# Patient Record
Sex: MALE | Race: WHITE | NOT HISPANIC OR LATINO | Employment: UNEMPLOYED | URBAN - METROPOLITAN AREA
[De-identification: names, ages, dates, MRNs, and addresses within clinical notes are randomized per-mention and may not be internally consistent; named-entity substitution may affect disease eponyms.]

---

## 2018-02-03 ENCOUNTER — HOSPITAL ENCOUNTER (EMERGENCY)
Facility: HOSPITAL | Age: 28
Discharge: HOME | End: 2018-02-03
Attending: EMERGENCY MEDICINE | Admitting: EMERGENCY MEDICINE

## 2018-02-03 VITALS
SYSTOLIC BLOOD PRESSURE: 105 MMHG | WEIGHT: 170 LBS | OXYGEN SATURATION: 97 % | RESPIRATION RATE: 26 BRPM | HEIGHT: 70 IN | BODY MASS INDEX: 24.34 KG/M2 | HEART RATE: 100 BPM | DIASTOLIC BLOOD PRESSURE: 78 MMHG

## 2018-02-03 DIAGNOSIS — S05.01XA ABRASION OF RIGHT CORNEA, INITIAL ENCOUNTER: Primary | ICD-10-CM

## 2018-02-03 PROCEDURE — 99284 EMERGENCY DEPT VISIT MOD MDM: CPT

## 2018-02-03 RX ORDER — HYDROCODONE BITARTRATE AND ACETAMINOPHEN 5; 325 MG/1; MG/1
1 TABLET ORAL EVERY 6 HOURS PRN
Qty: 6 TABLET | Refills: 0 | Status: SHIPPED | OUTPATIENT
Start: 2018-02-03

## 2018-02-03 RX ORDER — ERYTHROMYCIN 5 MG/G
OINTMENT OPHTHALMIC EVERY 6 HOURS
Qty: 3.5 G | Refills: 0 | Status: SHIPPED | OUTPATIENT
Start: 2018-02-03 | End: 2018-02-06

## 2018-02-03 RX ORDER — TETRACAINE HYDROCHLORIDE 5 MG/ML
2 SOLUTION OPHTHALMIC ONCE
Status: COMPLETED | OUTPATIENT
Start: 2018-02-03 | End: 2018-02-03

## 2018-02-03 RX ORDER — HYDROCODONE BITARTRATE AND ACETAMINOPHEN 7.5; 325 MG/1; MG/1
1 TABLET ORAL ONCE
Status: COMPLETED | OUTPATIENT
Start: 2018-02-03 | End: 2018-02-03

## 2018-02-03 RX ORDER — CYCLOPENTOLATE HYDROCHLORIDE 10 MG/ML
2 SOLUTION/ DROPS OPHTHALMIC ONCE
Status: COMPLETED | OUTPATIENT
Start: 2018-02-03 | End: 2018-02-03

## 2018-02-03 RX ORDER — ERYTHROMYCIN 5 MG/G
1 OINTMENT OPHTHALMIC ONCE
Status: COMPLETED | OUTPATIENT
Start: 2018-02-03 | End: 2018-02-03

## 2018-02-03 RX ADMIN — CYCLOPENTOLATE HYDROCHLORIDE 2 DROP: 10 SOLUTION/ DROPS OPHTHALMIC at 06:31

## 2018-02-03 RX ADMIN — ERYTHROMYCIN 1 APPLICATION: 5 OINTMENT OPHTHALMIC at 06:30

## 2018-02-03 RX ADMIN — HYDROCODONE BITARTRATE AND ACETAMINOPHEN 1 TABLET: 7.5; 325 TABLET ORAL at 06:44

## 2018-02-03 RX ADMIN — TETRACAINE HYDROCHLORIDE 2 DROP: 5 SOLUTION OPHTHALMIC at 06:31

## 2018-02-03 NOTE — ED PROVIDER NOTES
"Subjective   History of Present Illness  This 27-year-old gentleman presents the emergency department with complaints of right eye pain.  The patient sustained an injury to the right eye when the tool he was using apparently broke or slipped striking him in the right side of the face.  The patient initially had some mild discomfort complaining of a foreign body sensation which gradually worsened as he rubbed the eye.  The patient's pain is progressed to the point that he presents the emergency department with a fair amount of discomfort.  The patient complains of light sensitivity.  There is, only as noted.  The patient was not using power tools at the time nor was there welding going on in the area.    Past medical history is benign for chronic medical issues    Current medications none    Social history he is a smoker he denies drug use he does use alcohol    Review of systems the patient denies other trauma or injury he felt well prior to this and had no medical complaints, review of systems is limited to his immediate trauma and ocular issues.  His only noted issue regarding his vision is that he has \"astigmatism\" and feels that he needs to see an eye doctor as he has some visual blurring on a daily basis    Physical evaluation he is awake and alert he appears quite uncomfortable he is afebrile his vital signs are reviewed and noted a minimal tachycardia he is noted to be normal  Head is normocephalic and atraumatic  There is no evidence of acute facial injury or deformity  There is no facial numbness  There is a fair degree of discomfort and he is unwilling to open his eye and gentle retraction of the lid notes some erythema of the sclera there is no P1 discharge the globe appears intact extraocular movements are intact the pupil is round it is reactive to light directly and consensually  Tetracaine is instilled in the eye by myself following this the patient had almost complete resolution of his discomfort he " is able to open his eyes he is noted to have pupils are equal round reactive to light directly and consensually and extraocular movements are intact.  There is no double vision field of vision is normal to direct confrontation.  Visual acuities were Frieda obtained he is noted to be 20/50 in the affected eye and 20/20 in the unaffected eye he tells me that his vision seems to be near normal for him however.  Seen examination notes a moderate sized corneal abrasion situated outside of the visual axis and medial to the visual there is no streaming of tetracaine  Slit lamp examination reveals the after mentioned corneal abrasion no other acute injuries are identified  Following slit lamp examination cyclogyl was instilled in the right eye for a dilated pupil exam.  With appropriate debilitation funduscopic examination was performed there is no evidence of intraocular blood.  Funduscopic examination is benign.  Again globe is intact.    Assessment corneal abrasion right eye    Plan we'll refer the patient to ophthalmology for reevaluation within the next several days to monitor healing.  In the interim the patient will be patched and started on erythromycin ophthalmic ointment.  Small amount of pain medication will be provided.  HEENT the potential for dependence with prolonged courses of opioids, only 6 doses have been prescribed.  I also spoke with him about the possibility of constipation as well as the need to not drive drink or operate heavy equipment.  I have counseled him on smoking cessation and dad not smoking will facilitate healing.  The patient indicates understanding of all of these issues.  Review of Systems    Past Medical History:   Diagnosis Date   • Broken neck        No Known Allergies    History reviewed. No pertinent surgical history.    History reviewed. No pertinent family history.    Social History     Social History   • Marital status: Single     Spouse name: N/A   • Number of children: N/A   •  Years of education: N/A     Social History Main Topics   • Smoking status: Current Every Day Smoker   • Smokeless tobacco: Never Used   • Alcohol use Yes   • Drug use: No   • Sexual activity: Not Asked     Other Topics Concern   • None     Social History Narrative   • None           Objective   Physical Exam   Eyes:           Procedures         ED Course  ED Course   Comment By Time   MARCUS query complete. Treatment plan to include limited course of prescribed  controlled substance. Risks including addiction, benefits, and alternatives presented to patient. Daniel Guadarrama MD 02/03 06                  [facility]    Final diagnoses:   Abrasion of right cornea, initial encounter            Daniel Guadarrama MD  02/03/18 [id number]